# Patient Record
Sex: MALE | Race: WHITE | NOT HISPANIC OR LATINO | Employment: OTHER | URBAN - METROPOLITAN AREA
[De-identification: names, ages, dates, MRNs, and addresses within clinical notes are randomized per-mention and may not be internally consistent; named-entity substitution may affect disease eponyms.]

---

## 2021-10-26 ENCOUNTER — TRANSFERRED RECORDS (OUTPATIENT)
Dept: HEALTH INFORMATION MANAGEMENT | Facility: CLINIC | Age: 76
End: 2021-10-26

## 2022-01-01 ENCOUNTER — TRANSFERRED RECORDS (OUTPATIENT)
Dept: HEALTH INFORMATION MANAGEMENT | Facility: CLINIC | Age: 77
End: 2022-01-01

## 2022-06-08 ENCOUNTER — TRANSFERRED RECORDS (OUTPATIENT)
Dept: HEALTH INFORMATION MANAGEMENT | Facility: CLINIC | Age: 77
End: 2022-06-08

## 2022-06-20 ENCOUNTER — HOSPITAL ENCOUNTER (OUTPATIENT)
Dept: NUCLEAR MEDICINE | Facility: CLINIC | Age: 77
Discharge: HOME | End: 2022-06-20
Admitting: NURSE PRACTITIONER
Payer: COMMERCIAL

## 2022-06-20 ENCOUNTER — HOSPITAL ENCOUNTER (OUTPATIENT)
Dept: NUCLEAR MEDICINE | Facility: CLINIC | Age: 77
Discharge: HOME | End: 2022-06-20

## 2022-06-20 DIAGNOSIS — R97.20 INCREASING PROSTATE SPECIFIC ANTIGEN LEVEL: ICD-10-CM

## 2022-06-20 DIAGNOSIS — C61 PROSTATE CANCER (H): ICD-10-CM

## 2022-06-20 PROCEDURE — A9503 TC99M MEDRONATE: HCPCS

## 2022-06-20 PROCEDURE — 343N000001 HC RX 343

## 2022-06-20 PROCEDURE — 78306 BONE IMAGING WHOLE BODY: CPT

## 2022-06-20 RX ORDER — TC 99M MEDRONATE 20 MG/10ML
23-27 INJECTION, POWDER, LYOPHILIZED, FOR SOLUTION INTRAVENOUS ONCE
Status: COMPLETED | OUTPATIENT
Start: 2022-06-20 | End: 2022-06-20

## 2022-06-20 RX ADMIN — TC 99M MEDRONATE 25.3 MCI.: 20 INJECTION, POWDER, LYOPHILIZED, FOR SOLUTION INTRAVENOUS at 11:18

## 2023-01-01 ENCOUNTER — PATIENT OUTREACH (OUTPATIENT)
Dept: ONCOLOGY | Facility: CLINIC | Age: 78
End: 2023-01-01

## 2023-01-01 ENCOUNTER — TRANSCRIBE ORDERS (OUTPATIENT)
Dept: OTHER | Age: 78
End: 2023-01-01

## 2023-01-01 ENCOUNTER — ONCOLOGY VISIT (OUTPATIENT)
Dept: ONCOLOGY | Facility: CLINIC | Age: 78
End: 2023-01-01
Attending: STUDENT IN AN ORGANIZED HEALTH CARE EDUCATION/TRAINING PROGRAM
Payer: COMMERCIAL

## 2023-01-01 ENCOUNTER — LAB REQUISITION (OUTPATIENT)
Dept: LAB | Facility: CLINIC | Age: 78
End: 2023-01-01

## 2023-01-01 ENCOUNTER — PRE VISIT (OUTPATIENT)
Dept: ONCOLOGY | Facility: CLINIC | Age: 78
End: 2023-01-01

## 2023-01-01 VITALS
HEIGHT: 68 IN | SYSTOLIC BLOOD PRESSURE: 105 MMHG | DIASTOLIC BLOOD PRESSURE: 69 MMHG | OXYGEN SATURATION: 99 % | HEART RATE: 108 BPM | WEIGHT: 206 LBS | BODY MASS INDEX: 31.22 KG/M2 | RESPIRATION RATE: 18 BRPM

## 2023-01-01 DIAGNOSIS — C61 MALIGNANT NEOPLASM OF PROSTATE (H): Primary | ICD-10-CM

## 2023-01-01 LAB
PATH REPORT.COMMENTS IMP SPEC: ABNORMAL
PATH REPORT.COMMENTS IMP SPEC: YES
PATH REPORT.FINAL DX SPEC: ABNORMAL
PATH REPORT.GROSS SPEC: ABNORMAL
PATH REPORT.MICROSCOPIC SPEC OTHER STN: ABNORMAL
PATH REPORT.RELEVANT HX SPEC: ABNORMAL
PATH REPORT.RELEVANT HX SPEC: ABNORMAL
PATH REPORT.SITE OF ORIGIN SPEC: ABNORMAL

## 2023-01-01 PROCEDURE — 99205 OFFICE O/P NEW HI 60 MIN: CPT | Performed by: INTERNAL MEDICINE

## 2023-01-01 PROCEDURE — 88321 CONSLTJ&REPRT SLD PREP ELSWR: CPT | Performed by: PATHOLOGY

## 2023-01-01 PROCEDURE — G0463 HOSPITAL OUTPT CLINIC VISIT: HCPCS | Performed by: INTERNAL MEDICINE

## 2023-01-01 RX ORDER — AMLODIPINE BESYLATE 10 MG/1
5 TABLET ORAL
COMMUNITY
Start: 2022-01-01

## 2023-01-01 RX ORDER — SENNOSIDES A AND B 8.6 MG/1
8.6 TABLET, FILM COATED ORAL
COMMUNITY
Start: 2023-01-01

## 2023-01-01 RX ORDER — ATORVASTATIN CALCIUM 10 MG/1
10 TABLET, FILM COATED ORAL
COMMUNITY
Start: 2023-01-01

## 2023-01-01 RX ORDER — FENTANYL 25 UG/1
PATCH TRANSDERMAL
COMMUNITY
Start: 2023-01-01

## 2023-01-01 RX ORDER — DEXAMETHASONE 2 MG/1
2 TABLET ORAL
COMMUNITY
Start: 2023-01-01

## 2023-01-01 RX ORDER — DULOXETIN HYDROCHLORIDE 60 MG/1
1 CAPSULE, DELAYED RELEASE ORAL DAILY
COMMUNITY
Start: 2022-01-01

## 2023-01-01 RX ORDER — OXYCODONE HYDROCHLORIDE 5 MG/1
15 TABLET ORAL
COMMUNITY
Start: 2023-01-01

## 2023-01-01 RX ORDER — LISINOPRIL 20 MG/1
1 TABLET ORAL DAILY
COMMUNITY
Start: 2023-01-01

## 2023-01-01 RX ORDER — TAMSULOSIN HYDROCHLORIDE 0.4 MG/1
0.4 CAPSULE ORAL
COMMUNITY
Start: 2022-01-01

## 2023-01-01 RX ORDER — BUPROPION HYDROCHLORIDE 150 MG/1
1 TABLET ORAL EVERY MORNING
COMMUNITY
Start: 2023-01-01

## 2023-01-01 ASSESSMENT — PAIN SCALES - GENERAL: PAINLEVEL: NO PAIN (0)

## 2023-05-17 NOTE — PROGRESS NOTES
New Patient Oncology Nurse Navigator Note     Referring provider:   Leny Arreola    [facility]  [place] [address].   Ph. [phone]   Fax. [phone].   Referral from fax.   Auth # [id number]    Referred to (specialty): Medical Oncology    Date Referral Received: 05/17/23     Evaluation for :   Prostate cancer - eval for Pluvicto     Clinical History (per Nurse review of records provided):    Metastatic prostate cancer, progressed on abiraterone, olaparib, enzalutamide and docetaxel.  He is being referred for discussion of Pluvicto.      [facility] Location ([facility] Everywhere, [facility], etc[facility]   [facility]      SCHEDULE: First available, NEW, with Khoi Sims or Je @ [facility]    1100  I called Arian to discuss referral, but he was at the dentist and asked I call him back later.    1150  Arian called me back and requested I speak with his wife.  I introduced my role and reviewed what this consult visit will entail/what to expect. She was not able to write anything down at this time and they don't have [employer].  I let them know they can request an email from our scheduling team and they should be able to send them maps and other info for contact. She has no other questions at this time.    I forwarded on referral with scheduling instructions for the following   PLAN: SCHEDULE: First available, NEW, with Khoi Sims or Je @ [facility]    I transferred call to our new patient scheduling to finalize appointment.    Genny Crawley RN, BSN  Oncology New Patient Nurse Navigator   [facility]  [phone]

## 2023-05-25 NOTE — TELEPHONE ENCOUNTER
Action May 25, 2023 11:24 AM MADELYN   Action Taken Records received from [facility] and sent to [facility] for upload

## 2023-06-06 NOTE — TELEPHONE ENCOUNTER
RECORDS STATUS - ALL OTHER DIAGNOSIS    Malignant neoplasm of prostate (H)  RECORDS RECEIVED FROM: [facility]- scans are in PACS   DATE RECEIVED: 6/7/2023    Action    Action Taken 6/6/2023 10:33AM HOSSEIN     I called the [facility] to see if they have any scans from 2023. PH: [phone]- I spoke to Perlita in the film file room. She put me on hold- they only have unrelated scans from 2023. I faxed a request for PET Oct 18th 2021, CT Chest Abdomen Pelvis Oct 26 2021, CT Chest Abdomen Pelvis March 16 2022. Fax: [phone]    I called the [facility] Path Dept PH: [phone]- they pulled up the pt's chart. They will fax over the path report.     6/7/2023 4:02pm HOSSEIN   I faxed records records the [facility] to HIM    6/9/2023 12:24pm  Slides received from the [facility] (12 slides). Slides sent to fifth floor pathology for review.       NOTES STATUS DETAILS   OFFICE NOTE from referring provider     OFFICE NOTE from medical oncologist Complete [facility] records are in EPIC   DISCHARGE SUMMARY from hospital     DISCHARGE REPORT from the ER     OPERATIVE REPORT     MEDICATION LIST     CLINICAL TRIAL TREATMENTS TO DATE     LABS     PATHOLOGY REPORTS Requested- [facility]   PH: [phone]  Fax: [phone]  Mailing Address:   [address]    Path [address] [place] [id number]  [employer] Tracking Number: [id number] Bone Biopsy showed prostate Cancer   10/26/2021  Case: [id number]   ANYTHING RELATED TO DIAGNOSIS     GENONOMIC TESTING     TYPE:     IMAGING (NEED IMAGES & REPORT)     CT SCANS Complete CT Chest Abdomen Pelvis - [facility] 11/2/2022   MRI     MAMMO     ULTRASOUND     PET

## 2023-06-06 NOTE — PROGRESS NOTES
NEW PATIENT SECOND OPINION CONSULTATION    Reason for Visit:  Metastatic CRPC s/p abiraterone, olaparib, enzalutamide and docetaxel; referral for 177Lu-PSMA-617.    History of Present Illness:  Dear Dr Leny Arreola,    Thank you for referring your patient for a Second Opinion consultation at the [facility].  A brief overview of his oncological history as well as my recommendations follow below.  [facility], and much of the history was obtained from the patient and his wife.    Mr. Taylor is a 77-year-old man who was diagnosed with prostate cancer approximately two years ago at the end of 2021.  He was found to have metastatic prostate cancer at that time with multiple osseous metastases.  He underwent a bone biopsy on 10/26/2021 which confirmed metastatic prostatic adenocarcinoma.  The patient began treatment with leuprolide in 11/2021.  He was also treated with abiraterone concurrently with androgen deprivation therapy.    In 7/2022 he developed metastatic CRPC, and the abiraterone was discontinued.  He was then treated with olaparib, from 8/2022 until 11/2022.  He then received enzalutamide from 12/2022 until 3/2023.  On 5/4/2023, he began docetaxel chemotherapy and he has received 2 cycles thus far (his second cycle was administered on 5/25/2023).    His most recent imaging assessment was a CT scan performed on 11/2/2022.  This showed widespread osseous metastases which were stable.  He also had evidence of progressive mediastinal and retroperitoneal lymphadenopathy at that time.  His most recent PSA level was drawn on 5/25/2023, and his PSA level at that time was 403.5 ng/mL.    The patient presents for a Second Opinion and to discuss the appropriateness of radioligand therapy using 177Lu-PSMA-617.  I do not believe that he has obtained a PSMA-PET scan yet.  He is also potentially interested in clinical trial participation, if  appropriate.    Review of Systems:  The patient reports feeling generally well although he did have some toxicities from the recent docetaxel chemotherapy.  His most significant symptom is bone pain, which is requiring a fentanyl patch plus short acting oxycodone for breakthrough pain.  He also suffers from cervical spondylosis as well as carpal tunnel syndrome.  He has not lost or gained any weight recently.  A comprehensive 14-system review of symptoms is otherwise generally within normal limits.  His ECOG score is 1.  His pain score is 2/10.    Past Medical History:  Hypertension  Hyperlipidemia  Depression and anxiety  Cancer related pain  Cervical spondylosis  Carpal tunnel syndrome    Medications:  Leuprolide 45 mg IM q6mo  Amlodipine 5 mg  Hydrochlorothiazide 25 mg  Lisinopril 20 mg  Atorvastatin 10 mg  Omeprazole 20 mg  Bupropion 150 mg  Duloxetine 60 mg  Tamsulosin 0.4 mg  Fentanyl patch 25 mcg/hr  Oxycodone 5 mg PRN    Allergies:  No known drug allergies.    Social History:  The patient lives in [place], [place] (which is approximately 30 minutes from [place]).  He is , and came to the clinic with his wife.  He is a former cigarette smoker, but quit many years ago.  He does not drink regular alcoholic beverages.    Family History:  Dad - brain tumor.  Mom - colon cancer.  Brother - lung cancer.  Sister - lung cancer.  Brother - bladder cancer.  Sister - breast cancer.  Other sister - uterine and breast cancers.  He thinks that he might have a germline BRCA2 mutation, but he is not sure.    Physical Examination:  Mr. Taylor is a 77-year-old man who appears relatively comfortable at rest.  His vital signs are within normal limits.  The HEENT examination is generally unremarkable.  There is no palpable supraclavicular, cervical, axillary or inguinal lymphadenopathy.  The cardiovascular, pulmonary, and gastrointestinal examinations are generally within normal limits.  The neuromuscular examination  is grossly intact.  The extremities demonstrate trace bilateral pitting ankle edema.  The skin evaluation is unremarkable.      ASSESSMENT AND PLAN:  Mr. Taylor is a 77-year-old man with metastatic CRPC who has previously received abiraterone, olaparib, enzalutamide, and docetaxel chemotherapy.  His metastatic disease involves the bones as well as mediastinal and retroperitoneal lymphadenopathy.  His PSA level is 403.5 ng/mL.  His ECOG score is 1.  His pain score is 2/10.    This patient has been referred for consideration of Jina-PSMA-617 radioligand therapy.  He certainly meets the clinical criteria for treatment with Lutetium.  However, he would first need a PSMA-PET scan to ensure that his metastatic sites express the target.  To this end, I will order a PSMA-PET scan which will hopefully take place over the next two to three weeks.  After that, I could certainly refer him to our Nuclear Medicine department for consideration of Jina-PSMA-617 therapy.  We also took some time to discuss alternative treatment options.  These would include radium-223 as well as cabazitaxel chemotherapy.  The patient's preference was to try radioligand therapy first.    In addition, the patient might be eligible for one or more of our clinical trials.  For example, we we will soon open a study testing a PSMA-directed bispecific T cell engager that he may be interested in ([id number]).  That study is not yet open, but we anticipate to activate it within the next month.  He could consider participation in that clinical trial either before or after receiving Jina-PSMA-617 therapy.    A total of 80 minutes were spent on this patient on the day of the consultation, of which more than 50% of this time was used for counseling and coordination of care.  He was given the opportunity to ask multiple questions today, all of which were answered to his satisfaction.    Paul Sams M.D.

## 2023-06-07 NOTE — LETTER
6/7/2023         RE: Arian Taylor  [address] [facility] [address]        Dear Colleague,    Thank you for referring your patient, Arian Taylor, to the [facility]. Please see a copy of my visit note below.      NEW PATIENT SECOND OPINION CONSULTATION    Reason for Visit:  Metastatic CRPC s/p abiraterone, olaparib, enzalutamide and docetaxel; referral for 177Lu-PSMA-617.    History of Present Illness:  Dear Dr Leny Arreola,    Thank you for referring your patient for a Second Opinion consultation at the [facility].  A brief overview of his oncological history as well as my recommendations follow below.  [facility], and much of the history was obtained from the patient and his wife.    Mr. Taylor is a 77-year-old man who was diagnosed with prostate cancer approximately two years ago at the end of 2021.  He was found to have metastatic prostate cancer at that time with multiple osseous metastases.  He underwent a bone biopsy on 10/26/2021 which confirmed metastatic prostatic adenocarcinoma.  The patient began treatment with leuprolide in 11/2021.  He was also treated with abiraterone concurrently with androgen deprivation therapy.    In 7/2022 he developed metastatic CRPC, and the abiraterone was discontinued.  He was then treated with olaparib, from 8/2022 until 11/2022.  He then received enzalutamide from 12/2022 until 3/2023.  On 5/4/2023, he began docetaxel chemotherapy and he has received 2 cycles thus far (his second cycle was administered on 5/25/2023).    His most recent imaging assessment was a CT scan performed on 11/2/2022.  This showed widespread osseous metastases which were stable.  He also had evidence of progressive mediastinal and retroperitoneal lymphadenopathy at that time.  His most recent PSA level was drawn on 5/25/2023, and his PSA level at that time was 403.5  ng/mL.    The patient presents for a Second Opinion and to discuss the appropriateness of radioligand therapy using 177Lu-PSMA-617.  I do not believe that he has obtained a PSMA-PET scan yet.  He is also potentially interested in clinical trial participation, if appropriate.    Review of Systems:  The patient reports feeling generally well although he did have some toxicities from the recent docetaxel chemotherapy.  His most significant symptom is bone pain, which is requiring a fentanyl patch plus short acting oxycodone for breakthrough pain.  He also suffers from cervical spondylosis as well as carpal tunnel syndrome.  He has not lost or gained any weight recently.  A comprehensive 14-system review of symptoms is otherwise generally within normal limits.  His ECOG score is 1.  His pain score is 2/10.    Past Medical History:  Hypertension  Hyperlipidemia  Depression and anxiety  Cancer related pain  Cervical spondylosis  Carpal tunnel syndrome    Medications:  Leuprolide 45 mg IM q6mo  Amlodipine 5 mg  Hydrochlorothiazide 25 mg  Lisinopril 20 mg  Atorvastatin 10 mg  Omeprazole 20 mg  Bupropion 150 mg  Duloxetine 60 mg  Tamsulosin 0.4 mg  Fentanyl patch 25 mcg/hr  Oxycodone 5 mg PRN    Allergies:  No known drug allergies.    Social History:  The patient lives in [place], [place] (which is approximately 30 minutes from [place]).  He is , and came to the clinic with his wife.  He is a former cigarette smoker, but quit many years ago.  He does not drink regular alcoholic beverages.    Family History:  Dad - brain tumor.  Mom - colon cancer.  Brother - lung cancer.  Sister - lung cancer.  Brother - bladder cancer.  Sister - breast cancer.  Other sister - uterine and breast cancers.  He thinks that he might have a germline BRCA2 mutation, but he is not sure.    Physical Examination:  Mr. Taylor is a 77-year-old man who appears relatively comfortable at rest.  His vital signs are within normal limits.  The  HEENT examination is generally unremarkable.  There is no palpable supraclavicular, cervical, axillary or inguinal lymphadenopathy.  The cardiovascular, pulmonary, and gastrointestinal examinations are generally within normal limits.  The neuromuscular examination is grossly intact.  The extremities demonstrate trace bilateral pitting ankle edema.  The skin evaluation is unremarkable.      ASSESSMENT AND PLAN:  Mr. Taylor is a 77-year-old man with metastatic CRPC who has previously received abiraterone, olaparib, enzalutamide, and docetaxel chemotherapy.  His metastatic disease involves the bones as well as mediastinal and retroperitoneal lymphadenopathy.  His PSA level is 403.5 ng/mL.  His ECOG score is 1.  His pain score is 2/10.    This patient has been referred for consideration of Jina-PSMA-617 radioligand therapy.  He certainly meets the clinical criteria for treatment with Lutetium.  However, he would first need a PSMA-PET scan to ensure that his metastatic sites express the target.  To this end, I will order a PSMA-PET scan which will hopefully take place over the next two to three weeks.  After that, I could certainly refer him to our Nuclear Medicine department for consideration of Jina-PSMA-617 therapy.  We also took some time to discuss alternative treatment options.  These would include radium-223 as well as cabazitaxel chemotherapy.  The patient's preference was to try radioligand therapy first.    In addition, the patient might be eligible for one or more of our clinical trials.  For example, we we will soon open a study testing a PSMA-directed bispecific T cell engager that he may be interested in ([id number]).  That study is not yet open, but we anticipate to activate it within the next month.  He could consider participation in that clinical trial either before or after receiving Jina-PSMA-617 therapy.    A total of 80 minutes were spent on this patient on the day of the consultation, of which more  than 50% of this time was used for counseling and coordination of care.  He was given the opportunity to ask multiple questions today, all of which were answered to his satisfaction.    Paul Sams M.D.

## 2023-06-07 NOTE — NURSING NOTE
"Oncology Rooming Note    June 7, 2023 12:56 PM   Arian Taylor is a 77 year old male who presents for:    Chief Complaint   Patient presents with     Oncology Clinic Visit     Prostate Ca     Initial Vitals: /69   Pulse 108   Resp 18   Ht 1.715 m (5' 7.5\")   Wt 93.4 kg (206 lb)   SpO2 99%   BMI 31.79 kg/m   Estimated body mass index is 31.79 kg/m  as calculated from the following:    Height as of this encounter: 1.715 m (5' 7.5\").    Weight as of this encounter: 93.4 kg (206 lb). Body surface area is 2.11 meters squared.  No Pain (0) Comment: Data Unavailable   No LMP for male patient.  Allergies reviewed: Yes  Medications reviewed: Yes    Medications: Medication refills not needed today.  Pharmacy name entered into EPIC: [facility] - [place], [place] - [address]    Clinical concerns:  New pt consult.      Estrella Keith CMA              "

## 2023-09-19 NOTE — PROGRESS NOTES
"[facility]                                                                                      RNCC called and spoke with patient's wife Cristine regarding patient's status.  Discussed why the PSMA PET scan was cancelled.  She stated that the past 2 weeks have been downhill.  Patient's spouse stated that he \"was holding on for his son's wedding.\"  Patient has reportedly not spoken in the past 4 days.    [facility] because [facility] were high.      She reports the patient has been having spasms and she didn't think he could be still for the PSMA PET.    The patient is now on hospice and the family was told he has hours to days.      Susanne Zheng, RN, BSN  Oncology RN Care Coordinator  [facility]  " No